# Patient Record
Sex: FEMALE | Race: WHITE
[De-identification: names, ages, dates, MRNs, and addresses within clinical notes are randomized per-mention and may not be internally consistent; named-entity substitution may affect disease eponyms.]

---

## 2020-06-09 ENCOUNTER — HOSPITAL ENCOUNTER (OUTPATIENT)
Dept: HOSPITAL 88 - ER | Age: 85
Setting detail: OBSERVATION
LOS: 2 days | Discharge: HOME | End: 2020-06-11
Attending: INTERNAL MEDICINE | Admitting: INTERNAL MEDICINE
Payer: MEDICARE

## 2020-06-09 VITALS — WEIGHT: 212 LBS | BODY MASS INDEX: 33.27 KG/M2 | HEIGHT: 67 IN

## 2020-06-09 VITALS — SYSTOLIC BLOOD PRESSURE: 172 MMHG | DIASTOLIC BLOOD PRESSURE: 63 MMHG

## 2020-06-09 DIAGNOSIS — E03.9: ICD-10-CM

## 2020-06-09 DIAGNOSIS — R07.9: Primary | ICD-10-CM

## 2020-06-09 DIAGNOSIS — E78.5: ICD-10-CM

## 2020-06-09 DIAGNOSIS — E11.9: ICD-10-CM

## 2020-06-09 DIAGNOSIS — E11.40: ICD-10-CM

## 2020-06-09 DIAGNOSIS — Z79.01: ICD-10-CM

## 2020-06-09 DIAGNOSIS — M47.9: ICD-10-CM

## 2020-06-09 DIAGNOSIS — E66.9: ICD-10-CM

## 2020-06-09 DIAGNOSIS — G89.29: ICD-10-CM

## 2020-06-09 DIAGNOSIS — Z86.711: ICD-10-CM

## 2020-06-09 DIAGNOSIS — G47.33: ICD-10-CM

## 2020-06-09 LAB
ALBUMIN SERPL-MCNC: 3.6 G/DL (ref 3.5–5)
ALBUMIN/GLOB SERPL: 1.2 {RATIO} (ref 0.8–2)
ALP SERPL-CCNC: 61 IU/L (ref 40–150)
ALT SERPL-CCNC: 16 IU/L (ref 0–55)
ANION GAP SERPL CALC-SCNC: 17.4 MMOL/L (ref 8–16)
BACTERIA URNS QL MICRO: (no result) /HPF
BASOPHILS # BLD AUTO: 0.1 10*3/UL (ref 0–0.1)
BASOPHILS NFR BLD AUTO: 0.7 % (ref 0–1)
BILIRUB UR QL: NEGATIVE
BUN SERPL-MCNC: 19 MG/DL (ref 7–26)
BUN/CREAT SERPL: 19 (ref 6–25)
CALCIUM SERPL-MCNC: 9 MG/DL (ref 8.4–10.2)
CHLORIDE SERPL-SCNC: 103 MMOL/L (ref 98–107)
CK MB SERPL-MCNC: 2.4 NG/ML (ref 0–5)
CK MB SERPL-MCNC: 2.5 NG/ML (ref 0–5)
CK SERPL-CCNC: 28 IU/L (ref 29–168)
CK SERPL-CCNC: 35 IU/L (ref 29–168)
CLARITY UR: (no result)
CO2 SERPL-SCNC: 22 MMOL/L (ref 22–29)
COLOR UR: YELLOW
DEPRECATED APTT PLAS QN: 26.3 SECONDS (ref 23.8–35.5)
DEPRECATED INR PLAS: 0.82
DEPRECATED NEUTROPHILS # BLD AUTO: 4.8 10*3/UL (ref 2.1–6.9)
DEPRECATED RBC URNS MANUAL-ACNC: (no result) /HPF (ref 0–5)
EGFRCR SERPLBLD CKD-EPI 2021: 53 ML/MIN (ref 60–?)
EOSINOPHIL # BLD AUTO: 0.1 10*3/UL (ref 0–0.4)
EOSINOPHIL NFR BLD AUTO: 1.6 % (ref 0–6)
EPI CELLS URNS QL MICRO: (no result) /LPF
ERYTHROCYTE [DISTWIDTH] IN CORD BLOOD: 14.5 % (ref 11.7–14.4)
GLOBULIN PLAS-MCNC: 3 G/DL (ref 2.3–3.5)
GLUCOSE SERPLBLD-MCNC: 219 MG/DL (ref 74–118)
HCT VFR BLD AUTO: 42.9 % (ref 34.2–44.1)
HGB BLD-MCNC: 13 G/DL (ref 12–16)
KETONES UR QL STRIP.AUTO: NEGATIVE
LEUKOCYTE ESTERASE UR QL STRIP.AUTO: NEGATIVE
LYMPHOCYTES # BLD: 1.4 10*3/UL (ref 1–3.2)
LYMPHOCYTES NFR BLD AUTO: 20.1 % (ref 18–39.1)
MCH RBC QN AUTO: 26.2 PG (ref 28–32)
MCHC RBC AUTO-ENTMCNC: 30.3 G/DL (ref 31–35)
MCV RBC AUTO: 86.3 FL (ref 81–99)
MONOCYTES # BLD AUTO: 0.5 10*3/UL (ref 0.2–0.8)
MONOCYTES NFR BLD AUTO: 7 % (ref 4.4–11.3)
NEUTS SEG NFR BLD AUTO: 70.3 % (ref 38.7–80)
NITRITE UR QL STRIP.AUTO: NEGATIVE
PLATELET # BLD AUTO: 179 X10E3/UL (ref 140–360)
POTASSIUM SERPL-SCNC: 4.4 MMOL/L (ref 3.5–5.1)
PROT UR QL STRIP.AUTO: NEGATIVE
PROTHROMBIN TIME: 11.8 SECONDS (ref 11.9–14.5)
RBC # BLD AUTO: 4.97 X10E6/UL (ref 3.6–5.1)
SODIUM SERPL-SCNC: 138 MMOL/L (ref 136–145)
SP GR UR STRIP: >=1.03 (ref 1.01–1.02)
UROBILINOGEN UR STRIP-MCNC: 0.2 MG/DL (ref 0.2–1)
WBC #/AREA URNS HPF: (no result) /HPF (ref 0–5)

## 2020-06-09 PROCEDURE — 82553 CREATINE MB FRACTION: CPT

## 2020-06-09 PROCEDURE — 87635 SARS-COV-2 COVID-19 AMP PRB: CPT

## 2020-06-09 PROCEDURE — 87086 URINE CULTURE/COLONY COUNT: CPT

## 2020-06-09 PROCEDURE — 93005 ELECTROCARDIOGRAM TRACING: CPT

## 2020-06-09 PROCEDURE — 85025 COMPLETE CBC W/AUTO DIFF WBC: CPT

## 2020-06-09 PROCEDURE — 82270 OCCULT BLOOD FECES: CPT

## 2020-06-09 PROCEDURE — 85730 THROMBOPLASTIN TIME PARTIAL: CPT

## 2020-06-09 PROCEDURE — 82550 ASSAY OF CK (CPK): CPT

## 2020-06-09 PROCEDURE — 71045 X-RAY EXAM CHEST 1 VIEW: CPT

## 2020-06-09 PROCEDURE — 81001 URINALYSIS AUTO W/SCOPE: CPT

## 2020-06-09 PROCEDURE — 93306 TTE W/DOPPLER COMPLETE: CPT

## 2020-06-09 PROCEDURE — 80061 LIPID PANEL: CPT

## 2020-06-09 PROCEDURE — 83036 HEMOGLOBIN GLYCOSYLATED A1C: CPT

## 2020-06-09 PROCEDURE — 82948 REAGENT STRIP/BLOOD GLUCOSE: CPT

## 2020-06-09 PROCEDURE — 78452 HT MUSCLE IMAGE SPECT MULT: CPT

## 2020-06-09 PROCEDURE — 36415 COLL VENOUS BLD VENIPUNCTURE: CPT

## 2020-06-09 PROCEDURE — 93017 CV STRESS TEST TRACING ONLY: CPT

## 2020-06-09 PROCEDURE — 84443 ASSAY THYROID STIM HORMONE: CPT

## 2020-06-09 PROCEDURE — 83880 ASSAY OF NATRIURETIC PEPTIDE: CPT

## 2020-06-09 PROCEDURE — 99284 EMERGENCY DEPT VISIT MOD MDM: CPT

## 2020-06-09 PROCEDURE — 84484 ASSAY OF TROPONIN QUANT: CPT

## 2020-06-09 PROCEDURE — 80048 BASIC METABOLIC PNL TOTAL CA: CPT

## 2020-06-09 PROCEDURE — 80053 COMPREHEN METABOLIC PANEL: CPT

## 2020-06-09 PROCEDURE — 85610 PROTHROMBIN TIME: CPT

## 2020-06-09 RX ADMIN — FAMOTIDINE SCH MG: 10 INJECTION, SOLUTION INTRAVENOUS at 21:00

## 2020-06-09 RX ADMIN — INSULIN LISPRO SCH UNIT: 100 INJECTION, SOLUTION INTRAVENOUS; SUBCUTANEOUS at 17:59

## 2020-06-09 RX ADMIN — INSULIN LISPRO SCH UNIT: 100 INJECTION, SOLUTION INTRAVENOUS; SUBCUTANEOUS at 21:00

## 2020-06-09 NOTE — DIAGNOSTIC IMAGING REPORT
Chest, 1 view,  6/9/2020.   

 



History: Chest pain.



Comparison: None available.



Findings: The cardiomediastinal silhouette and pulmonary vasculature are within

normal limits for a portable exam. There is no focal consolidation or pleural

effusion. A calcified granuloma is present in the right upper lobe. There are

no acute osseous or soft tissue abnormalities. 



Impression: 

No acute cardiopulmonary abnormality.



Signed by: Manny Ross on 6/9/2020 12:49 PM

## 2020-06-09 NOTE — NUR
PATIENT RECEIVED FROM ER PER STRETCHER. ALERT AND VERBALLY RESPONSIVE. SKIN WARM AND DRY TO 
TOUCH. DENIED PAIN OR DISCOMFORT AT THIS TIME. ASSISTED TO THE RESTROOM AND BACK TO BED, 
VOIDED LARGE AMOUNT OF YELLOW URINE.  TELEMETRY BOX 29 IN PLACE, YELLOW SOCKS APPLIED. ALL 
PERSONAL ITEMS CLOSE TO PATIENT. BED IN LOWER POSITION, CALL LIGHT AT REACH. INSTRUCTED TO 
CALL FOR ASSISTANCE AS NEEDED.

## 2020-06-09 NOTE — XMS REPORT
Continuity of Care Document

                             Created on: 2020



YOUNG BURNS

External Reference #: [id number]

: 1933

Sex: Female



Demographics





                          Address                   [address] [place] [address]  [address]

 

                          Home Phone                [phone]

 

                          Preferred Language        Unknown

 

                          Marital Status            Unknown

 

                          [employer] Affiliation     Unknown

 

                          Race                      Unknown

 

                                        Additional Race(s)  

 

                          Ethnic Group              Unknown





Author





                          Author                    [facility]

 

                          Organization              [facility]

 

                          Address                   [address] [place]. [address] [place] [address]  [id number]



 

                          Phone                     Unavailable







Care Team Providers





                    Care Team Member Name Role                Phone

 

                    SORAIDA SHANKS      Attphys             Unavailable







Problems

This patient has no known problems.



Allergies, Adverse Reactions, Alerts

This patient has no known allergies or adverse reactions.



Medications

This patient has no known medications.



Procedures

This patient has no known procedures.



Results





           Test Description Test Time  Test Comments Results    Result Comments 

Source

 

                CHEST SINGLE (PORTABLE) 2020 12:48:00                     

                              

                                                   [place] [address]      Patient Name: YOUNG BURNS                                MR 
#: [id number]                  : 1933                                  
Age/Sex: 86/F  Acct #: [id number]                              Req #: [id number]
[id number]  Adm Physician:                                                      
Ordered by: CONNIE SHANKS MD                         Report #: [id number]       
Location: ER                                      Room/Bed:                   
________________________________________________________________________________

___________________    Procedure: [id number] DX/CHEST SINGLE (PORTABLE)  Exam 
Date: 20                            Exam Time: 1215                       
                      REPORT STATUS: Signed    Chest, 1 view,  2020.        
    History: Chest pain.      Comparison: None available.      Findings: The 
cardiomediastinal silhouette and pulmonary vasculature are within   normal 
limits for a portable exam. There is no focal consolidation or pleural   effusi
on. A calcified granuloma is present in the right upper lobe. There are   no 
acute osseous or soft tissue abnormalities.       Impression:    No acute 
cardiopulmonary abnormality.      Signed by: Connie Ross on 2020 12:49 PM 
      Dictated By: CONNIE ROSS MD  Electronically Signed By: CONNIE ROSS MD 
on 20 124  Transcribed By: TIFFANI on 20       COPY TO:   
CONNIE SHANKS MD

## 2020-06-09 NOTE — EMERGENCY DEPARTMENT NOTE
History of Present Illnes


History of Present Illness


Chief Complaint:  Chest Pain


History of Present Illness


This is a 86 year old  female  sent to ed for eval reported abnormal 

EKG reading in office reading MI - pt denies cp sob n/v/d ha dizziness NAD -  

reason for pcp visit today was c/o elevated blood sugar - reported pain to jaw 

while in clinic reports c[ for several days 








Chief Complaint Comment HERE FOR ABNORMAL EKG AT DR. DIAS OFFICE, WAS 

ADVISED TO COME OVER HERE RIGHT AWAY. DENIES CHEST PAIN OR SHORTNESS OF BREATH.


Historian:  Patient


Arrival Mode:  Car


Onset (how long ago):  day(s) (3 days )


Location:  cp intermittent past 3 days


Quality:  denies cp sob dizziness ha now


Radiation:  Denies non-radiation, Denies back, Denies neck, Denies extremity, 

Denies abdomen, Denies periumbilical, Denies flank, Denies proximal, Denies 

distal, Denies other


Onset quality:  unable to specify


Duration (how long):  day(s) (3 days)


Timing of current episode:  other (denies hcp sob n/v/d ha dizziness)


Progression:  waxing and waning


Context:  Denies recent illness, Denies recent surgery, Denies recent 

immobilization, Denies recent travel, Denies trauma/injury, Denies new medicati

ons, Denies hx of DVT/PE, Denies non-compliance w/ medications, Denies other


Relieving factors:  none


Exacerbating factors:  none


Treatments prior to arrival:  none





Past Medical/Family History


Physician Review


I have reviewed the patient's past medical and family history.  Any updates have

 been documented here.





Past Medical History


Recent Fever:  No


Clinical Suspicion of Infectio:  No


New/Unexplained Change in Ment:  No


Past Medical History:  Hypertension, Diabetes, MI, Hypothyroidism, 

Hyperlipedemia


Past Surgical History:  Cholecysctectomy





Social History


Smoking Cessation:  Unknown if ever smoked


Counseling Performed:  No


Alcohol Use:  None


Any Illegal Drug Use:  No


TB Exposure/Symptoms:  No


Physically hurt or threatened:  No





Family History


Family history of heart diseas:  Yes





Other


Last Tetanus:  UNK


Last Flu:  Y


Last Pneumovax:  Y





Review of Systems


Review of Systems


Constitutional:  Reports no symptoms


EENTM:  Reports no symptoms


Cardiovascular:  Reports no symptoms, Reports chest pain (intermittent denies cp

 now )


Respiratory:  Reports no symptoms


Gastrointestinal:  Reports no symptoms


Genitourinary:  Reports no symptoms


Musculoskeletal:  Reports no symptoms


Integumentary:  Reports no symptoms


Neurological:  Reports no symptoms


Psychological:  Reports no symptoms


Endocrine:  Reports no symptoms


Hematological/Lymphatic:  Reports no symptoms


Review of other systems


All other systems reviewed and negative.








This is a 86 year old  female  sent to ed for eval reported abnormal 

EKG reading in office reading MI - pt denies cp sob n/v/d ha dizziness NAD -  

reason for pcp visit today was c/o elevated blood sugar - reported pain to jaw 

while in clinic reports c[ for several days





Physical Exam


Related Data


Allergies:  


Coded Allergies:  


     No Known Allergies (Unverified , 4/18/14)


Triage Vital Signs





Vital Signs








  Date Time  Temp Pulse Resp B/P (MAP) Pulse Ox O2 Delivery O2 Flow Rate FiO2


 


6/9/20 11:59 98.4 68 16 157/78 94   








Vital signs reviewed:  Yes





Physical Exam


CONSTITUTIONAL





Constitutional:  Reports well-developed, Reports well-nourished


HENT


HENT:  Reports normocephalic, Reports atraumatic, Reports oropharynx 

clear/moist, Reports nose normal


HENT L/R:  Reports left ext ear normal, Reports right ext ear normal


EYES





Eyes:  Reports PERRL, Reports conjunctivae normal


NECK


Neck:  Reports ROM normal


PULMONARY


Pulmonary:  Reports effort normal, Reports breath sounds normal


CARDIOVASCULAR





This is a 86 year old  female  sent to ed for eval reported abnormal 

EKG reading in office reading MI - pt denies cp sob n/v/d ha dizziness NAD -  

reason for pcp visit today was c/o elevated blood sugar - reported pain to jaw 

while in clinic reports cp for several days intermittent


Cardiovascular:  Reports regular rhythm, Reports heart sounds normal, Reports 

capillary refill normal, Reports normal rate, Reports murmur (2/6 systolic 

murmur noted / denies cp ), Reports other (denies cp sob n/v/d/ ha dizziness NAD

 at this itime -This is a 86 year old  female  sent to ed for eval repo

rted abnormal EKG reading in office reading MI - pt denies cp sob n/v/d ha 

dizziness NAD -  reason for pcp visit today was c/o elevated blood sugar - 

reported pain to jaw while in clinic reports c[ for several days )


GASTROINTESTINAL





Abdominal:  Reports soft, Reports nontender, Reports bowel sounds normal


GENITOURINARY





Genitourinary:  Reports exam deferred


SKIN


Skin:  Reports warm, Reports dry


MUSCULOSKELETAL





Musculoskeletal:  Reports ROM normal


NEUROLOGICAL





Neurological:  Reports alert, Reports oriented x 3, Reports no gross motor or 

sensory deficits


PSYCHOLOGICAL


Psychological:  Reports mood/affect normal, Reports judgement normal





Results


Laboratory


Result Diagram:  


6/9/20 1250                                                                     

           6/9/20 1250





Laboratory





Laboratory Tests








Test


 6/9/20


13:54 6/9/20


12:50


 


Urine Color


 Yellow


(YELLOW) 





 


Urine Clarity


 Sl cloudy


(CLEAR) 





 


Urine pH 5 (5 - 7)  


 


Urine Specific Gravity


 >=1.030


(1.010-1.025) 





 


Urine Protein


 Negative


(NEGATIVE) 





 


Urine Glucose (UA) 1+ (NEGATIVE)  


 


Urine Ketones


 Negative


(NEGATIVE) 





 


Urine Blood


 Negative


(NEGATIVE) 





 


Urine Nitrite


 Negative


(NEGATIVE) 





 


Urine Bilirubin


 Negative


(NEGATIVE) 





 


Urine Urobilinogen


 0.2 mg/dL (0.2


- 1) 





 


Urine Leukocyte Esterase


 Negative


(NEGATIVE) 





 


Urine RBC


 None /HPF


(0-5) 





 


Urine WBC 0-5 /HPF (0-5)  


 


Urine Epithelial Cells


 Many /LPF


(NONE) 





 


Urine Bacteria


 Many /HPF


(NONE) 





 


White Blood Count


 


 6.86 x10e3/uL


(4.8-10.8)


 


Red Blood Count


 


 4.97 x10e6/uL


(3.6-5.1)


 


Hemoglobin


 


 13.0 g/dL


(12.0-16.0)


 


Hematocrit


 


 42.9 %


(34.2-44.1)


 


Mean Corpuscular Volume


 


 86.3 fL


(81-99)


 


Mean Corpuscular Hemoglobin


 


 26.2 pg


(28-32)


 


Mean Corpuscular Hemoglobin


Concent 


 30.3 g/dL


(31-35)


 


Red Cell Distribution Width


 


 14.5 %


(11.7-14.4)


 


Platelet Count


 


 179 x10e3/uL


(140-360)


 


Neutrophils (%) (Auto)


 


 70.3 %


(38.7-80.0)


 


Lymphocytes (%) (Auto)


 


 20.1 %


(18.0-39.1)


 


Monocytes (%) (Auto)


 


 7.0  %


(4.4-11.3)


 


Eosinophils (%) (Auto)


 


 1.6 %


(0.0-6.0)


 


Basophils (%) (Auto)


 


 0.7 %


(0.0-1.0)


 


Neutrophils # (Auto)  4.8 (2.1-6.9) 


 


Lymphocytes # (Auto)  1.4 (1.0-3.2) 


 


Monocytes # (Auto)  0.5 (0.2-0.8) 


 


Eosinophils # (Auto)  0.1 (0.0-0.4) 


 


Basophils # (Auto)  0.1 (0.0-0.1) 


 


Absolute Immature Granulocyte


(auto 


 0.02 x10e3/uL


(0-0.1)


 


Prothrombin Time


 


 11.8 seconds


(11.9-14.5)


 


Prothromb Time International


Ratio 


 0.82 





 


Activated Partial


Thromboplast Time 


 26.3 seconds


(23.8-35.5)


 


Sodium Level


 


 138 mmol/L


(136-145)


 


Potassium Level


 


 4.4 mmol/L


(3.5-5.1)


 


Chloride Level


 


 103 mmol/L


()


 


Carbon Dioxide Level


 


 22 mmol/L


(22-29)


 


Anion Gap


 


 17.4 mmol/L


(8-16)


 


Blood Urea Nitrogen


 


 19 mg/dL


(7-26)


 


Creatinine


 


 1.00 mg/dL


(0.57-1.11)


 


Estimat Glomerular Filtration


Rate 


 53 ML/MIN


(60-)


 


BUN/Creatinine Ratio  19 (6-25) 


 


Glucose Level


 


 219 mg/dL


()


 


Calcium Level


 


 9.0 mg/dL


(8.4-10.2)


 


Total Bilirubin


 


 0.4 mg/dL


(0.2-1.2)


 


Aspartate Amino Transf


(AST/SGOT) 


 16 IU/L (5-34) 





 


Alanine Aminotransferase


(ALT/SGPT) 


 16 IU/L (0-55) 





 


Alkaline Phosphatase


 


 61 IU/L


()


 


Creatine Kinase


 


 35 IU/L


()


 


Creatine Kinase MB


 


 2.40 ng/mL


(0-5.0)


 


Troponin I


 


 0.021 ng/mL


(0-0.300)


 


B-Type Natriuretic Peptide


 


 76.0 pg/mL


(0-100)


 


Total Protein


 


 6.6 g/dL


(6.5-8.1)


 


Albumin


 


 3.6 g/dL


(3.5-5.0)


 


Globulin


 


 3.0 g/dL


(2.3-3.5)


 


Albumin/Globulin Ratio  1.2 (0.8-2.0) 








Laboratory Tests








Test


 6/9/20


13:54 6/9/20


12:50


 


Urine Color


 Yellow


(YELLOW) 





 


Urine Clarity


 Sl cloudy


(CLEAR) 





 


Urine pH 5 (5 - 7)  


 


Urine Specific Gravity


 >=1.030


(1.010-1.025) 





 


Urine Protein


 Negative


(NEGATIVE) 





 


Urine Glucose (UA) 1+ (NEGATIVE)  


 


Urine Ketones


 Negative


(NEGATIVE) 





 


Urine Blood


 Negative


(NEGATIVE) 





 


Urine Nitrite


 Negative


(NEGATIVE) 





 


Urine Bilirubin


 Negative


(NEGATIVE) 





 


Urine Urobilinogen


 0.2 mg/dL (0.2


- 1) 





 


Urine Leukocyte Esterase


 Negative


(NEGATIVE) 





 


Urine RBC


 None /HPF


(0-5) 





 


Urine WBC 0-5 /HPF (0-5)  


 


Urine Epithelial Cells


 Many /LPF


(NONE) 





 


Urine Bacteria


 Many /HPF


(NONE) 





 


White Blood Count


 


 6.86 x10e3/uL


(4.8-10.8)


 


Red Blood Count


 


 4.97 x10e6/uL


(3.6-5.1)


 


Hemoglobin


 


 13.0 g/dL


(12.0-16.0)


 


Hematocrit


 


 42.9 %


(34.2-44.1)


 


Mean Corpuscular Volume


 


 86.3 fL


(81-99)


 


Mean Corpuscular Hemoglobin


 


 26.2 pg


(28-32)


 


Mean Corpuscular Hemoglobin


Concent 


 30.3 g/dL


(31-35)


 


Red Cell Distribution Width


 


 14.5 %


(11.7-14.4)


 


Platelet Count


 


 179 x10e3/uL


(140-360)


 


Neutrophils (%) (Auto)


 


 70.3 %


(38.7-80.0)


 


Lymphocytes (%) (Auto)


 


 20.1 %


(18.0-39.1)


 


Monocytes (%) (Auto)


 


 7.0  %


(4.4-11.3)


 


Eosinophils (%) (Auto)


 


 1.6 %


(0.0-6.0)


 


Basophils (%) (Auto)


 


 0.7 %


(0.0-1.0)


 


Neutrophils # (Auto)  4.8 (2.1-6.9) 


 


Lymphocytes # (Auto)  1.4 (1.0-3.2) 


 


Monocytes # (Auto)  0.5 (0.2-0.8) 


 


Eosinophils # (Auto)  0.1 (0.0-0.4) 


 


Basophils # (Auto)  0.1 (0.0-0.1) 


 


Absolute Immature Granulocyte


(auto 


 0.02 x10e3/uL


(0-0.1)


 


Prothrombin Time


 


 11.8 seconds


(11.9-14.5)


 


Prothromb Time International


Ratio 


 0.82 





 


Activated Partial


Thromboplast Time 


 26.3 seconds


(23.8-35.5)


 


Sodium Level


 


 138 mmol/L


(136-145)


 


Potassium Level


 


 4.4 mmol/L


(3.5-5.1)


 


Chloride Level


 


 103 mmol/L


()


 


Carbon Dioxide Level


 


 22 mmol/L


(22-29)


 


Anion Gap


 


 17.4 mmol/L


(8-16)


 


Blood Urea Nitrogen


 


 19 mg/dL


(7-26)


 


Creatinine


 


 1.00 mg/dL


(0.57-1.11)


 


Estimat Glomerular Filtration


Rate 


 53 ML/MIN


(60-)


 


BUN/Creatinine Ratio  19 (6-25) 


 


Glucose Level


 


 219 mg/dL


()


 


Calcium Level


 


 9.0 mg/dL


(8.4-10.2)


 


Total Bilirubin


 


 0.4 mg/dL


(0.2-1.2)


 


Aspartate Amino Transf


(AST/SGOT) 


 16 IU/L (5-34) 





 


Alanine Aminotransferase


(ALT/SGPT) 


 16 IU/L (0-55) 





 


Alkaline Phosphatase


 


 61 IU/L


()


 


Creatine Kinase


 


 35 IU/L


()


 


Creatine Kinase MB


 


 2.40 ng/mL


(0-5.0)


 


Troponin I


 


 0.021 ng/mL


(0-0.300)


 


B-Type Natriuretic Peptide


 


 76.0 pg/mL


(0-100)


 


Total Protein


 


 6.6 g/dL


(6.5-8.1)


 


Albumin


 


 3.6 g/dL


(3.5-5.0)


 


Globulin


 


 3.0 g/dL


(2.3-3.5)


 


Albumin/Globulin Ratio  1.2 (0.8-2.0) 








Laboratory Tests








Test


 6/9/20


12:50


 


White Blood Count


 6.86 x10e3/uL


(4.8-10.8)


 


Red Blood Count


 4.97 x10e6/uL


(3.6-5.1)


 


Hemoglobin


 13.0 g/dL


(12.0-16.0)


 


Hematocrit


 42.9 %


(34.2-44.1)


 


Mean Corpuscular Volume


 86.3 fL


(81-99)


 


Mean Corpuscular Hemoglobin


 26.2 pg


(28-32)


 


Mean Corpuscular Hemoglobin


Concent 30.3 g/dL


(31-35)


 


Red Cell Distribution Width


 14.5 %


(11.7-14.4)


 


Platelet Count


 179 x10e3/uL


(140-360)


 


Neutrophils (%) (Auto)


 70.3 %


(38.7-80.0)


 


Lymphocytes (%) (Auto)


 20.1 %


(18.0-39.1)


 


Monocytes (%) (Auto)


 7.0  %


(4.4-11.3)


 


Eosinophils (%) (Auto)


 1.6 %


(0.0-6.0)


 


Basophils (%) (Auto)


 0.7 %


(0.0-1.0)


 


Neutrophils # (Auto) 4.8 (2.1-6.9) 


 


Lymphocytes # (Auto) 1.4 (1.0-3.2) 


 


Monocytes # (Auto) 0.5 (0.2-0.8) 


 


Eosinophils # (Auto) 0.1 (0.0-0.4) 


 


Basophils # (Auto) 0.1 (0.0-0.1) 


 


Absolute Immature Granulocyte


(auto 0.02 x10e3/uL


(0-0.1)


 


Prothrombin Time


 11.8 seconds


(11.9-14.5)


 


Prothromb Time International


Ratio 0.82 





 


Activated Partial


Thromboplast Time 26.3 seconds


(23.8-35.5)


 


Sodium Level


 138 mmol/L


(136-145)


 


Potassium Level


 4.4 mmol/L


(3.5-5.1)


 


Chloride Level


 103 mmol/L


()


 


Carbon Dioxide Level


 22 mmol/L


(22-29)


 


Anion Gap


 17.4 mmol/L


(8-16)


 


Blood Urea Nitrogen


 19 mg/dL


(7-26)


 


Creatinine


 1.00 mg/dL


(0.57-1.11)


 


Estimat Glomerular Filtration


Rate 53 ML/MIN


(60-)


 


BUN/Creatinine Ratio 19 (6-25) 


 


Glucose Level


 219 mg/dL


()


 


Calcium Level


 9.0 mg/dL


(8.4-10.2)


 


Total Bilirubin


 0.4 mg/dL


(0.2-1.2)


 


Aspartate Amino Transf


(AST/SGOT) 16 IU/L (5-34) 





 


Alanine Aminotransferase


(ALT/SGPT) 16 IU/L (0-55) 





 


Alkaline Phosphatase


 61 IU/L


()


 


Creatine Kinase


 35 IU/L


()


 


Creatine Kinase MB


 2.40 ng/mL


(0-5.0)


 


Troponin I


 0.021 ng/mL


(0-0.300)


 


B-Type Natriuretic Peptide


 76.0 pg/mL


(0-100)


 


Total Protein


 6.6 g/dL


(6.5-8.1)


 


Albumin


 3.6 g/dL


(3.5-5.0)


 


Globulin


 3.0 g/dL


(2.3-3.5)


 


Albumin/Globulin Ratio 1.2 (0.8-2.0) 








Laboratory Tests








Test


 6/9/20


12:50


 


White Blood Count


 6.86 x10e3/uL


(4.8-10.8)


 


Red Blood Count


 4.97 x10e6/uL


(3.6-5.1)


 


Hemoglobin


 13.0 g/dL


(12.0-16.0)


 


Hematocrit


 42.9 %


(34.2-44.1)


 


Mean Corpuscular Volume


 86.3 fL


(81-99)


 


Mean Corpuscular Hemoglobin


 26.2 pg


(28-32)


 


Mean Corpuscular Hemoglobin


Concent 30.3 g/dL


(31-35)


 


Red Cell Distribution Width


 14.5 %


(11.7-14.4)


 


Platelet Count


 179 x10e3/uL


(140-360)


 


Neutrophils (%) (Auto)


 70.3 %


(38.7-80.0)


 


Lymphocytes (%) (Auto)


 20.1 %


(18.0-39.1)


 


Monocytes (%) (Auto)


 7.0  %


(4.4-11.3)


 


Eosinophils (%) (Auto)


 1.6 %


(0.0-6.0)


 


Basophils (%) (Auto)


 0.7 %


(0.0-1.0)


 


Neutrophils # (Auto) 4.8 (2.1-6.9) 


 


Lymphocytes # (Auto) 1.4 (1.0-3.2) 


 


Monocytes # (Auto) 0.5 (0.2-0.8) 


 


Eosinophils # (Auto) 0.1 (0.0-0.4) 


 


Basophils # (Auto) 0.1 (0.0-0.1) 


 


Absolute Immature Granulocyte


(auto 0.02 x10e3/uL


(0-0.1)


 


Prothrombin Time


 11.8 seconds


(11.9-14.5)


 


Prothromb Time International


Ratio 0.82 





 


Activated Partial


Thromboplast Time 26.3 seconds


(23.8-35.5)


 


Sodium Level


 138 mmol/L


(136-145)


 


Potassium Level


 4.4 mmol/L


(3.5-5.1)


 


Chloride Level


 103 mmol/L


()


 


Carbon Dioxide Level


 22 mmol/L


(22-29)


 


Anion Gap


 17.4 mmol/L


(8-16)


 


Blood Urea Nitrogen


 19 mg/dL


(7-26)


 


Creatinine


 1.00 mg/dL


(0.57-1.11)


 


Estimat Glomerular Filtration


Rate 53 ML/MIN


(60-)


 


BUN/Creatinine Ratio 19 (6-25) 


 


Glucose Level


 219 mg/dL


()


 


Calcium Level


 9.0 mg/dL


(8.4-10.2)


 


Total Bilirubin


 0.4 mg/dL


(0.2-1.2)


 


Aspartate Amino Transf


(AST/SGOT) 16 IU/L (5-34) 





 


Alanine Aminotransferase


(ALT/SGPT) 16 IU/L (0-55) 





 


Alkaline Phosphatase


 61 IU/L


()


 


Creatine Kinase


 35 IU/L


()


 


Creatine Kinase MB


 2.40 ng/mL


(0-5.0)


 


Troponin I


 0.021 ng/mL


(0-0.300)


 


B-Type Natriuretic Peptide


 76.0 pg/mL


(0-100)


 


Total Protein


 6.6 g/dL


(6.5-8.1)


 


Albumin


 3.6 g/dL


(3.5-5.0)


 


Globulin


 3.0 g/dL


(2.3-3.5)


 


Albumin/Globulin Ratio 1.2 (0.8-2.0) 








Lab results reviewed:  Yes





Imaging


Impressions


Procedure: [id number] DX/CHEST SINGLE (PORTABLE)


Exam Date: 06/09/20                            Exam Time: 1215








                              REPORT STATUS: Signed





Chest, 1 view,  6/9/2020.   


 





History: Chest pain.





Comparison: None available.





Findings: The cardiomediastinal silhouette and pulmonary vasculature are within


normal limits for a portable exam. There is no focal consolidation or pleural


effusion. A calcified granuloma is present in the right upper lobe. There are


no acute osseous or soft tissue abnormalities. 





Impression: 


No acute cardiopulmonary abnormality.





Signed by: Manny Ross on 6/9/2020 12:49 PM








Dictated By: MANNY ROSS MD


Electronically Signed By: MANNY ROSS MD on 06/09/20 1249


Transcribed By: TIFFANI on 06/09/20 1249 








COPY TO:   CONNIE SHANKS MD~





Procedures


12 Lead ECG Interpretation


ECG Interpretation :  


   :  Interpreted by ED physician


   Date:  Jun 9, 2020


   Time:  11:55


   Prior ECG tracings:  reviewed


   Rhythm:  sinus rhythm


   Rate:  normal


   BPM:  68


   QRS axis:  right


   T wave inversion:  V1, V2





Assessment & Plan


Medical Decision Making


MDM





This is a 86 year old  female  sent to ed for eval reported abnormal 

EKG reading in office reading MI - pt denies cp sob n/v/d ha dizziness NAD -  

reason for pcp visit today was c/o elevated blood sugar - reported pain to jaw 

while in clinic reports cp for several days 











D/D


MI/STEMI/ NON STEMI/ CP 





pt presented w/ abnormal ekg from pcp office - ekg repeated in triage no acute 

findings noted - pt denies cp sob n/v/d ha dizziness asymptomatic non toxic 

afebrile NAD at this time





Reassessment


Reassessment


discussed lab ekg cxr results plan of care and need for admit 


noted bs per bmp 219 








spoke w/ Dr Brewster will admit 


Spoke w/ Dr Gil will consult





Assessment & Plan


Final Impression:  


(1) Diabetes


(2) Chest pain


Depart Disposition:  ADMITTED


Last Vital Signs











  Date Time  Temp Pulse Resp B/P (MAP) Pulse Ox O2 Delivery O2 Flow Rate FiO2


 


6/9/20 13:46  62 19 154/67 100   


 


6/9/20 12:37 98.8       








Home Meds


Reported Medications


Ramipril (RAMIPRIL) 10 Mg Capsule, 10 MG PO DAILY


   6/5/14


Ezetimibe (ZETIA) 10 Mg Tablet, 10 MG PO HS


   6/5/14


Metoprolol Tartrate (METOPROLOL TARTRATE) 25 Mg Tablet, 25 MG PO BID, TAB


   6/5/14


Pioglitazone Hcl (PIOGLITAZONE) 45 Mg Tablet, 45 MG PO DAILY


   6/5/14


Misoprostol (MISOPROSTOL) 200 Mcg Tablet, 200 MCG PO BID


   6/5/14


Tramadol Hcl (ULTRAM) 50 Mg Tablet, 50 MG PO BID, TAB


   6/5/14


Diclofenac Potassium (DICLOFENAC POTASSIUM) 50 Mg Tablet, 50 MG PO BID


   6/5/14


[Levothyroxine]   No Conflict Check, 25 MCG PO DAILY


   6/5/14


Esomeprazole Magnesium (NEXIUM) 40 Mg Capsule.dr, 40 MG PO DAILY


   6/5/14


Simvastatin (SIMVASTATIN) 40 Mg Tablet, 40 MG PO DAILY, TAB


   6/5/14


Glimepiride (GLIMEPIRIDE) 4 Mg Tablet, 4 MG PO DAILY


   6/5/14


Furosemide (LASIX) 40 Mg Tablet, 40 MG PO DAILY, #30 TAB


   6/5/14


Meclizine Hcl (MECLIZINE HCL) 25 Mg Tablet, 25 MG PO PRN


   6/5/14


[Metformin]   No Conflict Check, 1000 MG PO BID


   6/5/14











CONNIE SHANKS MD                Jun 9, 2020 14:02

## 2020-06-10 VITALS — DIASTOLIC BLOOD PRESSURE: 75 MMHG | SYSTOLIC BLOOD PRESSURE: 175 MMHG

## 2020-06-10 VITALS — DIASTOLIC BLOOD PRESSURE: 84 MMHG | SYSTOLIC BLOOD PRESSURE: 172 MMHG

## 2020-06-10 VITALS — SYSTOLIC BLOOD PRESSURE: 152 MMHG | DIASTOLIC BLOOD PRESSURE: 82 MMHG

## 2020-06-10 VITALS — DIASTOLIC BLOOD PRESSURE: 75 MMHG | SYSTOLIC BLOOD PRESSURE: 176 MMHG

## 2020-06-10 VITALS — SYSTOLIC BLOOD PRESSURE: 170 MMHG | DIASTOLIC BLOOD PRESSURE: 82 MMHG

## 2020-06-10 VITALS — DIASTOLIC BLOOD PRESSURE: 79 MMHG | SYSTOLIC BLOOD PRESSURE: 168 MMHG

## 2020-06-10 LAB
ANION GAP SERPL CALC-SCNC: 15.5 MMOL/L (ref 8–16)
BASOPHILS # BLD AUTO: 0.1 10*3/UL (ref 0–0.1)
BASOPHILS NFR BLD AUTO: 0.7 % (ref 0–1)
BUN SERPL-MCNC: 18 MG/DL (ref 7–26)
BUN/CREAT SERPL: 21 (ref 6–25)
CALCIUM SERPL-MCNC: 9.6 MG/DL (ref 8.4–10.2)
CHLORIDE SERPL-SCNC: 103 MMOL/L (ref 98–107)
CHOLEST SERPL-MCNC: 188 MD/DL (ref 0–199)
CHOLEST/HDLC SERPL: 3.8 {RATIO} (ref 3–3.6)
CK MB SERPL-MCNC: 1 NG/ML (ref 0–5)
CK MB SERPL-MCNC: 1.2 NG/ML (ref 0–5)
CK SERPL-CCNC: 36 IU/L (ref 29–168)
CK SERPL-CCNC: 40 IU/L (ref 29–168)
CO2 SERPL-SCNC: 25 MMOL/L (ref 22–29)
DEPRECATED NEUTROPHILS # BLD AUTO: 5.7 10*3/UL (ref 2.1–6.9)
EGFRCR SERPLBLD CKD-EPI 2021: > 60 ML/MIN (ref 60–?)
EOSINOPHIL # BLD AUTO: 0.2 10*3/UL (ref 0–0.4)
EOSINOPHIL NFR BLD AUTO: 2.1 % (ref 0–6)
ERYTHROCYTE [DISTWIDTH] IN CORD BLOOD: 14.7 % (ref 11.7–14.4)
GLUCOSE SERPLBLD-MCNC: 188 MG/DL (ref 74–118)
HCT VFR BLD AUTO: 43.4 % (ref 34.2–44.1)
HDLC SERPL-MSCNC: 49 MG/DL (ref 40–60)
HGB BLD-MCNC: 13.5 G/DL (ref 12–16)
LDLC SERPL CALC-MCNC: 107 MG/DL (ref 60–130)
LYMPHOCYTES # BLD: 2 10*3/UL (ref 1–3.2)
LYMPHOCYTES NFR BLD AUTO: 22.6 % (ref 18–39.1)
MCH RBC QN AUTO: 25.9 PG (ref 28–32)
MCHC RBC AUTO-ENTMCNC: 31.1 G/DL (ref 31–35)
MCV RBC AUTO: 83.1 FL (ref 81–99)
MONOCYTES # BLD AUTO: 0.8 10*3/UL (ref 0.2–0.8)
MONOCYTES NFR BLD AUTO: 8.7 % (ref 4.4–11.3)
NEUTS SEG NFR BLD AUTO: 65.6 % (ref 38.7–80)
PLATELET # BLD AUTO: 204 X10E3/UL (ref 140–360)
POTASSIUM SERPL-SCNC: 4.5 MMOL/L (ref 3.5–5.1)
RBC # BLD AUTO: 5.22 X10E6/UL (ref 3.6–5.1)
SODIUM SERPL-SCNC: 139 MMOL/L (ref 136–145)
TRIGL SERPL-MCNC: 158 MG/DL (ref 0–149)

## 2020-06-10 RX ADMIN — FAMOTIDINE SCH MG: 10 INJECTION, SOLUTION INTRAVENOUS at 20:28

## 2020-06-10 RX ADMIN — INSULIN LISPRO SCH UNIT: 100 INJECTION, SOLUTION INTRAVENOUS; SUBCUTANEOUS at 08:30

## 2020-06-10 RX ADMIN — RAMIPRIL SCH MG: 5 CAPSULE ORAL at 09:00

## 2020-06-10 RX ADMIN — INSULIN LISPRO SCH UNIT: 100 INJECTION, SOLUTION INTRAVENOUS; SUBCUTANEOUS at 16:30

## 2020-06-10 RX ADMIN — FAMOTIDINE SCH MG: 10 INJECTION, SOLUTION INTRAVENOUS at 09:00

## 2020-06-10 RX ADMIN — INSULIN LISPRO SCH UNIT: 100 INJECTION, SOLUTION INTRAVENOUS; SUBCUTANEOUS at 20:27

## 2020-06-10 RX ADMIN — CLOPIDOGREL BISULFATE SCH MG: 75 TABLET, FILM COATED ORAL at 09:00

## 2020-06-10 RX ADMIN — ASPIRIN SCH MG: 81 TABLET, COATED ORAL at 09:00

## 2020-06-10 RX ADMIN — METOPROLOL SUCCINATE SCH MG: 25 TABLET, EXTENDED RELEASE ORAL at 09:00

## 2020-06-10 RX ADMIN — INSULIN LISPRO SCH UNIT: 100 INJECTION, SOLUTION INTRAVENOUS; SUBCUTANEOUS at 11:30

## 2020-06-10 NOTE — NUR
PT BS ; 8 UNITS OF INSULIN GIVEN AS ORDERED. DAUGHTER AT BEDSIDE. DR MÉNDEZ TO SEE 
PT AND SPOKE WITH FAMILY

## 2020-06-10 NOTE — NUR
pt has [facility] prior to admission and is willing to reinstate 
with an order upon discharge.

## 2020-06-10 NOTE — CONSULTATION
DATE OF CONSULTATION:  2020

 

Cardiac Consultation 

 

REASON FOR CONSULTATION:  Poorly historian.  The patient having chest pain.

 

HISTORY OF PRESENT ILLNESS:  An 86-year-old lady, who is known with longstanding history

of diabetes mellitus, hypertension, strong family history of coronary artery disease,

hypercholesteremia, varicose veins, degenerative joint disease, and right hip fracture

in 2016, was complicated by postop pulmonary embolism.  The patient followed in

our office.  She keeps complaining of chest pain.  It is with typical and atypical

characteristic.  It is mid epigastric lower retrosternal.  Not related to activity.  The

patient having this problem back on and off.  The patient is supposed to see GI Service,

but did not see any GI Service.  Her last ischemic evaluation was in .  The patient

reluctant about having any stress test or any other cardiac evaluation and she is happy

with medication.  She is followed by her PCP, Dr. Mooney and she saw him and her

medications are adjusted because "her diabetes is under poor control."  The patient does

have this episode of chest pain where she is really truly poorly historian.  She is

supposed to have blood work today in his office and to see him, but she told them about

the chest pain, so they sent her immediately to the emergency room.  I visited with her.

 She is going back and forth with her history.  We called her daughter, Ginny, and we

discussed the case also with her and she got involved.  The patient definitely having

chest pain.  It is very, very plausible to be anginal in nature, although the patient

thinks probably there are some GI component.  This pain is of two types, probably one as

on exertion with characterized by easy fatigability, shortness of breath on exertion,

chest tightness, and the second is occasional burn and it happened when she is in bed.

Regardless, the patient continued to have this pain and she is here for evaluation. 

 

HOME MEDICATIONS:  Including aspirin 81 mg a day, Crestor 10 mg a day, metoprolol

succinate 25 mg a day, ramipril 5 mg a day, levothyroxine 25 mcg a day, glimepiride one

tablet b.i.d., Protonix 40 mg a day, Zoloft 100 mg a day, gabapentin 300 mg at bedtime,

trazodone 100 mg a day, VESIcare 5 mg a day.  The patient told me she started on

injection by Dr. Mooney for diabetes. 

 

PAST MEDICAL HISTORY:  

1. Diabetes mellitus since .

2. Hypertension. 

3. Hypercholesteremia.

4. Pulmonary embolism following right hip surgery in 2016.

5. Varicose vein.

6. Sleep apnea.

7. Degenerative joint disease of the back.

8. Peripheral neuropathy.

9. Decreased hearing.

10. Right hip fracture.

11. The patient is a little bit forgetful.

12. Cholecystectomy.

13. Left knee surgery.

14. Tubal ligation.

 

SOCIAL HISTORY:  She is a .  She is nonsmoker.  She is non-alcohol drinker.  She is

retired from [employer]. 

 

FAMILY HISTORY:  Mother  at age 76 of complication of diabetes mellitus and

myocardial infarction.  Father  at age with myocardial infarction and he was

hypertensive.  Nine siblings and five children, four brothers  of coronary artery

disease.  Even her son himself had coronary artery bypass surgery. 

 

REVIEW OF SYSTEMS:

GENERAL:  No fever, no chills.  No weight loss.  No weight gain. 

HEENT:  Decreased hearing. 

PULMONARY:  As per History and Physical. 

CARDIAC:  As per History and Physical. 

GI:  No hematemesis, no melena, but GERD. 

HEMATOLOGY:  Easy bruising, but no bleeding. 

:  Frequency and incontinence.  No dysuria. 

MUSCULOSKELETAL:  Back pain, knee pain, hand pain.  Peripheral vascular swelling. 

NEUROLOGICAL:  The patient is a little bit forgetful.  She uses walker for ambulation.

 

PHYSICAL EXAMINATION:

VITAL SIGNS:  Height of 5 feet 7 inches, weight of 210 pounds, blood pressure 125/80,

heart rate of 60, respiratory rate of 18. 

HEENT:  Pupils are reactive.  Decreased hearing is noted. 

NECK:  No elevation of jugular venous pulsation.  No thyromegaly. 

CHEST:  Decreased lung expansion, but clear to auscultation and percussion. 

HEART:  PMI 5th left intercostal space.  Normal first and second heart sound. 

ABDOMEN:  Soft with good bowel sounds.  No organomegaly. 

EXTREMITIES:  No cyanosis, no clubbing, no edema.  Varicose veins are noted. 

NEUROLOGIC:  Awake, alert, and oriented.  Able to move all extremities.

LABORATORY DATA:  First set of cardiac enzymes normal.  BNP is normal.  EKG, no acute

changes.  Chest x-ray by report, no major abnormality. 

 

IMPRESSION AND PLAN:  

1. Poorly historian.

2. Diabetes mellitus with severe end-organ damage on many years duration.

3. Chest pain with several cardiac risk factors very plausible to be angina.

4. Gastroesophageal reflux disease.

5. Dyspnea on exertion.

6. Diabetes mellitus, poorly controlled.

7. Past history of pulmonary embolism.

8. Venous insufficiency of the lower extremities.

9. Degenerative joint disease. 

We have a lengthy discussion with the patient and her daughter.  We will then schedule

the patient for a nuclear cardiac stress test tomorrow.  We are going to rule her out

for myocardial infarction.  We will check an echocardiogram.  Case discussed and

explained.  Questions are answered. 

 

 

 

 

______________________________

MD NOREEN Cruz/MODL

D:  2020 19:24:38

T:  06/10/2020 01:50:00

Job #:  [id number]

## 2020-06-11 VITALS — DIASTOLIC BLOOD PRESSURE: 86 MMHG | SYSTOLIC BLOOD PRESSURE: 145 MMHG

## 2020-06-11 VITALS — SYSTOLIC BLOOD PRESSURE: 177 MMHG | DIASTOLIC BLOOD PRESSURE: 79 MMHG

## 2020-06-11 VITALS — SYSTOLIC BLOOD PRESSURE: 156 MMHG | DIASTOLIC BLOOD PRESSURE: 84 MMHG

## 2020-06-11 VITALS — SYSTOLIC BLOOD PRESSURE: 135 MMHG | DIASTOLIC BLOOD PRESSURE: 61 MMHG

## 2020-06-11 LAB
ALBUMIN SERPL-MCNC: 3.5 G/DL (ref 3.5–5)
ALBUMIN/GLOB SERPL: 1.3 {RATIO} (ref 0.8–2)
ALP SERPL-CCNC: 60 IU/L (ref 40–150)
ALT SERPL-CCNC: 13 IU/L (ref 0–55)
ANION GAP SERPL CALC-SCNC: 16.9 MMOL/L (ref 8–16)
BASOPHILS # BLD AUTO: 0 10*3/UL (ref 0–0.1)
BASOPHILS NFR BLD AUTO: 0.4 % (ref 0–1)
BUN SERPL-MCNC: 15 MG/DL (ref 7–26)
BUN/CREAT SERPL: 18 (ref 6–25)
CALCIUM SERPL-MCNC: 9.1 MG/DL (ref 8.4–10.2)
CHLORIDE SERPL-SCNC: 101 MMOL/L (ref 98–107)
CO2 SERPL-SCNC: 23 MMOL/L (ref 22–29)
DEPRECATED NEUTROPHILS # BLD AUTO: 7.9 10*3/UL (ref 2.1–6.9)
EGFRCR SERPLBLD CKD-EPI 2021: > 60 ML/MIN (ref 60–?)
EOSINOPHIL # BLD AUTO: 0 10*3/UL (ref 0–0.4)
EOSINOPHIL NFR BLD AUTO: 0.3 % (ref 0–6)
ERYTHROCYTE [DISTWIDTH] IN CORD BLOOD: 14.6 % (ref 11.7–14.4)
GLOBULIN PLAS-MCNC: 2.7 G/DL (ref 2.3–3.5)
GLUCOSE SERPLBLD-MCNC: 267 MG/DL (ref 74–118)
HCT VFR BLD AUTO: 41.5 % (ref 34.2–44.1)
HGB BLD-MCNC: 12.7 G/DL (ref 12–16)
LYMPHOCYTES # BLD: 0.8 10*3/UL (ref 1–3.2)
LYMPHOCYTES NFR BLD AUTO: 8.2 % (ref 18–39.1)
MCH RBC QN AUTO: 26.5 PG (ref 28–32)
MCHC RBC AUTO-ENTMCNC: 30.6 G/DL (ref 31–35)
MCV RBC AUTO: 86.5 FL (ref 81–99)
MONOCYTES # BLD AUTO: 0.4 10*3/UL (ref 0.2–0.8)
MONOCYTES NFR BLD AUTO: 4 % (ref 4.4–11.3)
NEUTS SEG NFR BLD AUTO: 86.8 % (ref 38.7–80)
PLATELET # BLD AUTO: 154 X10E3/UL (ref 140–360)
POTASSIUM SERPL-SCNC: 3.9 MMOL/L (ref 3.5–5.1)
RBC # BLD AUTO: 4.8 X10E6/UL (ref 3.6–5.1)
SODIUM SERPL-SCNC: 137 MMOL/L (ref 136–145)

## 2020-06-11 RX ADMIN — ASPIRIN SCH MG: 81 TABLET, COATED ORAL at 09:00

## 2020-06-11 RX ADMIN — RAMIPRIL SCH MG: 5 CAPSULE ORAL at 09:00

## 2020-06-11 RX ADMIN — INSULIN LISPRO SCH UNIT: 100 INJECTION, SOLUTION INTRAVENOUS; SUBCUTANEOUS at 11:30

## 2020-06-11 RX ADMIN — INSULIN LISPRO SCH UNIT: 100 INJECTION, SOLUTION INTRAVENOUS; SUBCUTANEOUS at 09:39

## 2020-06-11 RX ADMIN — CLOPIDOGREL BISULFATE SCH MG: 75 TABLET, FILM COATED ORAL at 09:00

## 2020-06-11 RX ADMIN — METOPROLOL SUCCINATE SCH MG: 25 TABLET, EXTENDED RELEASE ORAL at 09:00

## 2020-06-11 RX ADMIN — FAMOTIDINE SCH MG: 10 INJECTION, SOLUTION INTRAVENOUS at 09:00

## 2020-06-11 NOTE — NUR
PT GIVEN HER DISCHARGE INSTRUCTION ALONG WITH HER PRESCRIPTION DAUGHTER AT BEDSIDE. IV 
REMOVED EARLIER.

## 2020-06-11 NOTE — NUR
Patient c/o pain HA and nausea. Pain and nausea meds given as ordered by MD. Continue 
monitor. Patient had 50cc of yellow emesis. Cool towel applied to forehead. Patient resting 
quitly at this time.

## 2020-06-11 NOTE — MYOVIEW STRESS TEST
DATE OF STUDY:  06/09/2020 19:15:00

 

Stress Test - Lexiscan

 

PROCEDURE PERFORMED:  Lexiscan nuclear stress test.

 

INDICATION FOR STUDY:  Chest pain and inability to exercise. 

 

This is to be associated with dictation/study #[id number].

 

TECHNICAL DETAILS:  After risks, benefits, pros and cons of today's Lexiscan 
nuclear

stress test explained to the patient, the patient agreed to proceed.  She was 
brought

down to the stress lab where she received a Lexiscan dose 0.4 mg intravenously 
followed

by an 11 mCi dose of technetium tetrofosmin intravenously.  Resting heart rate 
went from

a baseline of 73 beats per minute to a maximum of 92 beats per minute.  Blood 
pressure

went from baseline of 158/90 to 185/86 with appropriate hemodynamic response. 

 

Underlying EKG revealed normal sinus rhythm with nonspecific T-wave inversions 
in

inferior leads and with Lexiscan infusion there were no ischemic EKG changes or

symptoms.  After 25 minutes, patient was then taken to the SPECT camera for 
resting

myocardial perfusion imaging.  After waiting for appropriate amount of time, 
patient was

then brought back to the nuclear lab where she received 33 millicuries of 
technetium-99m

tetrofosmin intravenously and after 40 minutes patient was then taken to the 
SPECT

camera for resting myocardial perfusion imaging. 

 

FINDINGS:  

1. Stress myocardial perfusion imaging reveals a moderate area of decreased 
tracer

uptake in the lateral and inferior lateral walls. 

2. Resting myocardial perfusion imaging reveals largely normal tracer uptake.

3. The following gated measurements were obtained.  End-diastolic volume 63 mL, 
end

systolic volumes 11 mL, calculated left ventricular ejection fraction is 82% 
with normal

wall motion. 

 

CONCLUSIONS:  

1. Abnormal myocardial perfusion imaging study revealing at least moderate size

decreased radiotracer uptake in the lateral inferolateral wall concerning for 
ischemia

as this appears to be completely reversible and not present at resting image. 

2. Normal left ventricular function with calculated ejection fraction of 82%.

3. Overall findings of the stress test are compatible with at least a moderate 
risk

stress test. 

 

 

 

 

______________________________

MD FIOR Araujo/MANDO

D:  06/10/2020 16:25:58

T:  06/10/2020 19:06:53

Job #:  [id number]

 

ROSALINDA

## 2020-06-12 NOTE — DISCHARGE SUMMARY
CONSULTING PHYSICIAN:  Adelina Gil MD, with Cardiology.

 

CHIEF COMPLAINT:  Chest pain.

 

HISTORY OF PRESENT ILLNESS:  The patient is an 86-year-old female admitted with

complaints of abnormal EKG at the PCP office.  She complained of intermittent chest pain

and followed a cardiologist on outpatient already.  She was taken to the cath lab for

stress test. 

 

PAST MEDICAL HISTORY:  Includes type 2 diabetes mellitus, hypertension, hyperlipidemia,

PE, obstructive sleep apnea, chronic back pain, and hypothyroidism. 

 

PAST SURGICAL HISTORY:  Tubal ligation, right hip surgery, cholecystectomy, and left

knee surgery. 

 

FAMILY HISTORY:  Mother had diabetes mellitus, myocardial infarction in mother, father

and son. 

 

SOCIAL HISTORY:  Noncontributory.

 

ALLERGIES:  NO KNOWN ALLERGIES.

 

ADMITTING DIAGNOSES:  

1. Chest pain.

2. Type 2 diabetes mellitus.

3. Hypertension.

4. Chronic pain.

5. Hyperlipidemia.

6. Hypothyroidism.

7. Obesity with BMI of 33.2.

 

DISCHARGE DIAGNOSES:  

1. Chest pain.

2. Type 2 diabetes mellitus.

3. Hypertension.

4. Chronic pain.

5. Hyperlipidemia.

6. Hypothyroidism.

7. Obesity with BMI of 33.2.

 

HOSPITAL COURSE:  She had an echocardiogram on 06/10/2020, with an estimated ejection

fraction of 55%, AV thickening, trace AI and tricuspid regurgitation.  A 12-lead EKG on

June 10th, showed normal sinus rhythm with a ventricular rate of 69.  On June 9th, she

had a 12-lead EKG which showed right ventricular hypertrophy, ventricular rate 68,

normal sinus rhythm.  On admission; WBC 6.86, hemoglobin 13, hematocrit 42.9, and

platelets 179.  Sodium 138, potassium 4.4, chloride 103, CO2 of 22, BUN 19, creatinine

1.0, estimated GFR 53, glucose 219, and calcium 9.0.  LFTs were within normal limits.

B-type natriuretic peptide 76.  Cardiac enzymes were within normal limits.

Triglycerides 158, cholesterol 188, , and HDL 49.  Second creatine kinase was 28,

second CK-MB 2.5.  Troponin I 0.030.  Third set of cardiac biomarkers; creatine kinase

36, CK-MB 1.2, troponin I 0.030.  Hemoglobin A1c was 8.3%.  Urinalysis showed 1+ glucose

and many bacteria.  Stool for occult blood was negative.  Coronavirus collected 6/10 is

pending.  Final urine culture showed mixed dez contamination.  Chest x-ray on 06/09

showed no acute cardiopulmonary abnormality.  Today lab shows glucose 267, otherwise

chemistry essentially normal.  WBC 9.1, hemoglobin 12.7, hematocrit 41.5, and platelets

154.  Telemetry shows normal sinus rhythm with heart rate 68.  Subjectively, patient

currently has minimal complaints.  Last night, she did have nausea, vomiting, and

diarrhea.  She had 3 diarrhea stools last night and had chills.  The patient is to

follow up with Cardiology on Monday.  We will send her home today with prescriptions for

Toprol-XL 25 mg p.o. daily, Nitrostat p.r.n. for chest pain and Plavix 75 mg p.o. daily.

 Continue ADA diet.  Activity level as tolerated.  Follow up with PCP, Dr. Edward Mooney in 1-2 weeks.  Follow up with Dr. Gil on Monday.  Temperature 97.5, heart

rate 77, blood pressure 177/79, respirations 16, and oxygen saturation 94%. 

 

 

Dictated by Hung Miranda NP

 

______________________________

MD RGACE GordonP/MODL

D:  06/11/2020 11:57:06

T:  06/12/2020 05:07:02

Job #:  [id number]